# Patient Record
Sex: FEMALE | Race: ASIAN | URBAN - METROPOLITAN AREA
[De-identification: names, ages, dates, MRNs, and addresses within clinical notes are randomized per-mention and may not be internally consistent; named-entity substitution may affect disease eponyms.]

---

## 2018-01-16 ENCOUNTER — RADIANT APPOINTMENT (OUTPATIENT)
Dept: GENERAL RADIOLOGY | Facility: CLINIC | Age: 43
End: 2018-01-16
Attending: FAMILY MEDICINE
Payer: COMMERCIAL

## 2018-01-16 ENCOUNTER — OFFICE VISIT (OUTPATIENT)
Dept: ORTHOPEDICS | Facility: CLINIC | Age: 43
End: 2018-01-16
Payer: COMMERCIAL

## 2018-01-16 VITALS
DIASTOLIC BLOOD PRESSURE: 72 MMHG | WEIGHT: 105 LBS | SYSTOLIC BLOOD PRESSURE: 110 MMHG | BODY MASS INDEX: 18.61 KG/M2 | HEIGHT: 63 IN

## 2018-01-16 DIAGNOSIS — M25.521 RIGHT ELBOW PAIN: Primary | ICD-10-CM

## 2018-01-16 DIAGNOSIS — M77.11 LATERAL EPICONDYLITIS OF RIGHT ELBOW: ICD-10-CM

## 2018-01-16 DIAGNOSIS — M25.521 RIGHT ELBOW PAIN: ICD-10-CM

## 2018-01-16 PROCEDURE — 99203 OFFICE O/P NEW LOW 30 MIN: CPT | Performed by: FAMILY MEDICINE

## 2018-01-16 PROCEDURE — 73080 X-RAY EXAM OF ELBOW: CPT | Mod: RT

## 2018-01-16 NOTE — PATIENT INSTRUCTIONS
1. Right elbow pain    2. Lateral epicondylitis of right elbow      Hand therapy: [facility] - [phone]  Given a cock up wrist brace  Reviewed xray - no arthritis or fluid in the joint  Activity modification: tight , wrist extension or index & middle finger extension will exacerbate your symptoms    Follow up after 3-4 hand therapy sessions if not improving or sooner if needed. Call direct clinic number [[phone]] at any time with questions or concerns.

## 2018-01-16 NOTE — PROGRESS NOTES
"ASSESSMENT & PLAN    1. Right elbow pain    2. Lateral epicondylitis of right elbow      Hand therapy: [facility] - [phone]  Given a cock up wrist brace  Reviewed xray - no arthritis or fluid in the joint  Activity modification: tight , wrist extension or index & middle finger extension will exacerbate your symptoms    Follow up after 3-4 hand therapy sessions if not improving or sooner if needed. Call direct clinic number [[phone]] at any time with questions or concerns.    -----    SUBJECTIVE  Eri Luna is a/an 42 year old right hand dominant female who is seen as a self referral for evaluation of right elbow pain. The patient is seen by themselves.    Onset: 3 month(s) ago. Reports insidious onset without acute precipitating event.  Location of Pain: right anterior elbow pain   Rating of Pain at worst: 10/10 (extending elbow after sleeping)  Rating of Pain Currently: 0/10  Worsened by: elbow flexion and lifting, extending arm in the morning  Better with: massage / TENS - but short duration  Treatments tried: rest/activity avoidance, heat, home exercises, physical therapy (stretch, theraband/barbell strengthening) and chiropractic care   Associated symptoms: tingling in all of her fingers (all 5 - just the tips, very intermittent)  Orthopedic history: NO  Relevant surgical history: NO  Patient Social History: works at [employer]    Patient's past medical, surgical, social, and family histories were reviewed today and no changes are noted.    REVIEW OF SYSTEMS:  10 point ROS is negative other than symptoms noted above in HPI, Past Medical History or as stated below  Constitutional: NEGATIVE for fever, chills, change in weight  Skin: NEGATIVE for worrisome rashes, moles or lesions  GI/: NEGATIVE for bowel or bladder changes  Neuro: NEGATIVE for weakness, dizziness or paresthesias    OBJECTIVE:  /72  Ht 5' 3\" (1.6 m)  Wt 105 lb (47.6 kg)  BMI 18.6 " kg/m2   General: healthy, alert and in no distress  HEENT: no scleral icterus or conjunctival erythema  Skin: no suspicious lesions or rash. No jaundice.  CV: regular rhythm by palpation  Resp: normal respiratory effort without conversational dyspnea   Psych: normal mood and affect  Gait: normal steady gait with appropriate coordination and balance  Neuro: Normal sensory exam of bilateral hands.   MSK:  RIGHT ELBOW  Inspection:    No swelling or obvious deformity or asymmetry  Palpation:    Tender about the lateral epicondyle, common extensor tendon and mildly tender in distal bicep muscle. Nontender over distal bicep tendon/radial tuberosity. Remainder of bony, ligamentous and tendinous landmarks are nontender.    Crepitus is Absent  Range of Motion:     Extension full / flexion full / pronation full / supination full  Strength:    No deficits in flexion, extension, pronation, or supination.  Special Tests:    Positive: Pain with resisted wrist extension, pain with resisted middle finger extension and tight     Negative: Speed's and Finkelsteins.    Independent visualization of the below image:  Recent Results (from the past 24 hour(s))   XR Elbow Right G/E 3 Views    Narrative    XR ELBOW RT G/E 3 VW 1/16/2018 12:03 PM     HISTORY: ; Right elbow pain      Impression    IMPRESSION: Negative exam.    JESENIA CARBAJAL MD     Patient's conditions were thoroughly discussed during today's visit with greater than 50% of the visit spent counseling the patient with total time spent face-to-face with the patient being 20 minutes.    Quincy Galindo, DO [facility]

## 2018-01-16 NOTE — MR AVS SNAPSHOT
After Visit Summary   1/16/2018    Eri Luna    MRN: [id number]           Patient Information     Date Of Birth          1975        Visit Information        Provider Department      1/16/2018 11:40 AM Quincy Galindo DO [facility]        Today's Diagnoses     Right elbow pain    -  1    Lateral epicondylitis of right elbow          Care Instructions    1. Right elbow pain    2. Lateral epicondylitis of right elbow      Hand therapy: [facility] - [phone]  Given a cock up wrist brace  Reviewed xray - no arthritis or fluid in the joint  Activity modification: tight , wrist extension or index & middle finger extension will exacerbate your symptoms    Follow up after 3-4 hand therapy sessions if not improving or sooner if needed. Call direct clinic number [[phone]] at any time with questions or concerns.              Follow-ups after your visit        Additional Services     [facility] PT, HAND, AND CHIROPRACTIC REFERRAL       **This order will print in the [facility] Scheduling Office**    Physical Therapy, Hand Therapy and Chiropractic Care are available through:    *[facility]  *[facility] [place]  *[facility]    Call one number to schedule at any of the above locations: [phone].    Your provider has referred you to: Hand Therapy    Indication/Reason for Referral: Elbow Pain - Right Lat Epi  Onset of Illness: see chart  Therapy Orders: Evaluate and Treat  Special Programs: None  Special Request: please provide custom wrist orthosis as appropriate    Paolo Boone      Additional Comments for the Therapist or Chiropractor:     Please be aware that coverage of these services is subject to the terms and limitations of your health insurance plan.  Call member services at your health plan with any benefit or coverage questions.      Please bring the following to your appointment:    *Your personal  "calendar for scheduling future appointments  *Comfortable clothing                  Who to contact     If you have questions or need follow up information about today's clinic visit or your schedule please contact [facility] directly at [phone].  Normal or non-critical lab and imaging results will be communicated to you by MyChart, letter or phone within 4 business days after the clinic has received the results. If you do not hear from us within 7 days, please contact the clinic through MyChart or phone. If you have a critical or abnormal lab result, we will notify you by phone as soon as possible.  Submit refill requests through [employer] or call your pharmacy and they will forward the refill request to us. Please allow 3 business days for your refill to be completed.          Additional Information About Your Visit        [employer] Information     [employer] lets you send messages to your doctor, view your test results, renew your prescriptions, schedule appointments and more. To sign up, go to [email or web address] . Click on \"Log in\" on the left side of the screen, which will take you to the Welcome page. Then click on \"Sign up Now\" on the right side of the page.     You will be asked to enter the access code listed below, as well as some personal information. Please follow the directions to create your username and password.     Your access code is: [email or web address]  Expires: 2018 12:22 PM     Your access code will  in 90 days. If you need help or a new code, please call your [place] clinic or [phone].        Care EveryWhere ID     This is your Care EveryWhere ID. This could be used by other organizations to access your [place] medical records  [id number]        Your Vitals Were     Height BMI (Body Mass Index)                5' 3\" (1.6 m) 18.6 kg/m2           Blood Pressure from Last 3 Encounters:   18 110/72   16 98/74   16 110/63    Weight from Last 3 " Encounters:   01/16/18 105 lb (47.6 kg)   12/22/16 141 lb (64 kg)              We Performed the Following     [facility] PT, HAND, AND CHIROPRACTIC REFERRAL        Primary Care Provider Office Phone # Fax #    Sarah Nagy [phone]       [facility] [address]  [place] [address]        Equal Access to Services     [facility]: Hadii aad ku hadasho Soomaali, waaxda luqadaha, qaybta kaalmada adeegyada, waxay idiin hayaan adeeg kharash la'aan . So [facility] [phone].    ATENCIÓN: Si hattie aguilar, tiene a dave disposición servicios gratuitos de asistencia lingüística. Llame al [phone].    We comply with applicable federal civil rights laws and [place] laws. We do not discriminate on the basis of race, color, national origin, age, disability, sex, sexual orientation, or gender identity.            Thank you!     Thank you for choosing [facility]  for your care. Our goal is always to provide you with excellent care. Hearing back from our patients is one way we can continue to improve our services. Please take a few minutes to complete the written survey that you may receive in the mail after your visit with us. Thank you!             Your Updated Medication List - Protect others around you: Learn how to safely use, store and throw away your medicines at www.disposemymeds.org.          This list is accurate as of: 1/16/18 12:22 PM.  Always use your most recent med list.                   Brand Name Dispense Instructions for use Diagnosis    ibuprofen 400 MG tablet    ADVIL/MOTRIN    60 tablet    Take 1-2 tablets (400-800 mg) by mouth every 6 hours as needed for other (cramping)    Vaginal delivery

## 2018-01-25 ENCOUNTER — THERAPY VISIT (OUTPATIENT)
Dept: OCCUPATIONAL THERAPY | Facility: CLINIC | Age: 43
End: 2018-01-25
Payer: COMMERCIAL

## 2018-01-25 DIAGNOSIS — M25.521 ELBOW PAIN, RIGHT: Primary | ICD-10-CM

## 2018-01-25 PROCEDURE — 97535 SELF CARE MNGMENT TRAINING: CPT | Mod: GO | Performed by: OCCUPATIONAL THERAPIST

## 2018-01-25 PROCEDURE — 97165 OT EVAL LOW COMPLEX 30 MIN: CPT | Mod: GO | Performed by: OCCUPATIONAL THERAPIST

## 2018-01-25 PROCEDURE — 97110 THERAPEUTIC EXERCISES: CPT | Mod: GO | Performed by: OCCUPATIONAL THERAPIST

## 2018-01-25 NOTE — PROGRESS NOTES
Hand Therapy Initial Evaluation  Current Date:  1/25/2018    Subjective:  Eri Luna is a 42 year old R hand dominant female.    Diagnosis: R LEP  MD order date 1/15/18  DOI: Began 3 months ago    Patient reports symptoms of pain, stiffness/loss of motion and weakness/loss of strength of the R elbow which occurred due to Carrying baby, prolonged desk work. Since onset symptoms are gradually getting better. Special tests:  X ray neg for fracture.  Previous treatment: Massage, Chiropractor, Ultrasound, neck and back adjustment, OTC brace. General health as reported by patient is excellent.  Pertinent medical history includes: none.  Medical allergies: none.  Surgical history: none.  Medication history: none.    Occupational Profile Information:  Current occupation is Real Estate  Currently working in normal job without restrictions  Job Tasks: prolonged sitting, computer work  Prior functional level:  no limitations  Barriers include:none  Mobility: No difficulty  Transportation: drives  Leisure activities/hobbies: [email or web address]    Upper Extremity Functional Index Score:  SCORE:   Column Totals: /80: 55   (A lower score indicates greater disability.)      Objective:  Pain Level Report: On scale 0-10/10  Date 1/25/2018    side R    Overall 3    At Rest 0    With Activity 5      Primary Report: location and description  Date 1/25/2018    Side R    Location LEP, forearm, upper arm    Radiation Present    Pain Quality Sharp    Frequency Intermittant    Duration Worse at night    Exacerbated by Lifting overhead, opening a jar    Relieved by Brace, rest    Progression since onset Gradually better       Tenderness: Pain level report on scale 0-10/10  Date 1/25/2018    Side R    Lateral Epicondyle 7    PIN 7    ECRB Insertion 0      Palpation Radial Nerve Path: Pain level report on scale 0-10/10  Date 1/25/2018    Side R    Supraspinatus 0    Triangular Interval 0    Spiral Groove 8    Radial Head 7    DSRN 5-6      Radial Nerve  Tension Test: 25%    Range of Motion Elbow AROM (PROM): WNL per obeservation    Range of Motion Wrist AROM (PROM): WNL per observation    AROM Multi-joint:  Date 1/25/2018 1/25/2018   Side L R   Elbow - FA - Wrist     90 - Neut - Flex NT NT   90 -  Pro  - Flex NT NT   Ext - Neut - Flex 60 65   Ext -  Pro  - Flex 55 45     Resisted Testing: MMT on scale 0-5/5, Pain level report on scale 0-10/10  Date 1/25/2018    Side R    Elbow Ext 5/5, 0/10    Elbow Flex 5/5, 0/10    Sup 5/5, 0/10    Pro 5/5, 0/10    Wrist Ext 4/5, 3-4/10    Wrist Flex 5/5, 0/10    Wrist Ext c elbow extended 3+/10, 5/10    Middle Finger test 4/5, 5/10      Strength: (Measured in pounds, pain scale 0-10/10)   with Elbow at 90: measured in pounds  Date 1/25/2018        Trials Left Right Left Right Left Right Left Right Left Right Left Right   1 45 20             2               3               Avg               Pain  2                with Elbow Extended: measured in pounds  Date 1/25/2018        Trials Left Right Left Right Left Right Left Right Left Right Left Right   1 35 10             2               3               Avg               Pain  3               3 Point Pinch  Date 1/25/2018        Trials Left Right Left Right Left Right Left Right Left Right Left Right   1 7 7             2               3               Avg               Pain 0 0               Lateral Pinch  Date 1/25/2018        Trials Left Right Left Right Left Right Left Right Left Right Left Right   1 7 7             2               3               Avg               Pain 0 0               Assessment:  Patient presents with symptoms consistent with diagnosis of R Lateral Epicondylitis,  with conservative intervention.     Patient's limitations or Problem List includes:  Pain, Decreased ROM/motion, Weakness, Decreased  and Tightness in musculature of the right elbow which interferes with the patient's ability to perform Self Care Tasks (dressing, eating), Work Tasks,  Sleep Patterns, Recreational Activities, Household Chores and Driving  as compared to previous level of function.    Rehab Potential:  Excellent - Return to full activity, no limitations    Patient will benefit from skilled Occupational Therapy to increase ROM, flexibility,  strength and forearm strength and decrease pain to return to previous activity level and resume normal daily tasks and to reach their rehab potential.    Barriers to Learning:  No barrier    Communication Issues:  Patient appears to be able to clearly communicate and understand verbal and written communication and follow directions correctly.    Assessment of Occupational Performance:  5 or more Performance Deficits  Identified Performance Deficits: dressing, child rearing, communication management, health management and maintenance, home establishment and management, meal preparation and cleanup, shopping, sleep and leisure activities      Clinical Decision Making (Complexity): Low complexity    Treatment Explanation:  The following has been discussed with the patient:    RX ordered/plan of care  Anticipated outcomes  Possible risks and side effects    P: Frequency:  1 X week, once daily  Duration:  for 8 weeks    Treatment Plan:    Modalities:  US   Therapeutic Exercise: AROM of elbow and wrist, PROM with stretch to wrist extensors and flexors,  ECRL strengthening progressing to ECRB strengthening (eccentric progressing to concentric ).   Manual Techniques: Radial head mobilization, interosseous membrane glide, friction massage, Myofascial release of the forearm extensors and flexors  Neuro Re-ed: Active/Passive radial nerve glides  Orthosis:  Wrist cock up orthosis     Home Program:   Exercise: AROM of elbow and wrist, PROM with stretch to wrist extensors and flexors, Active radial nerve glides  MFR to the extensor wad  Orthosis: Static orthosis Wrist cock up orthosis during the day.  Elbow strap as needed to re-distribute the pull of the  extensor wad.  Activity: Avoid activities that exacerbate pain in the elbow.  Lift with forearms in neutral position.     Discharge Plan:    Achieve all LTG.  Independent in home treatment program.  Reach maximal therapeutic benefit.    Next Visit:  Check on OTC orthosis  MFR  Nerve gliding  Progress exercise as appropriate

## 2018-01-25 NOTE — MR AVS SNAPSHOT
"              After Visit Summary   1/25/2018    Eri Lnua    MRN: [id number]           Patient Information     Date Of Birth          1975        Visit Information        Provider Department      1/25/2018 4:00 PM Lily Dale, OT [facility]        Today's Diagnoses     Elbow pain, right    -  1       Follow-ups after your visit        Your next 10 appointments already scheduled     Jan 29, 2018 12:30 PM CST   [facility] Hand with Ev Jacome   [facility] ([facility]  )    [address] [facility]  [address] [place] [address]   [phone]            Feb 05, 2018 11:30 AM CST   [facility] with Ev Leclaudionder   [facility] ([facility]  )    [address] [address]   [phone]            Feb 12, 2018 11:00 AM CST   [facility] with Ev Hernandezer   [facility] ([facility]  )    [address] [address] [address] [address]   [phone]              Who to contact     If you have questions or need follow up information about today's clinic visit or your schedule please contact [facility] directly at [phone].  Normal or non-critical lab and imaging results will be communicated to you by [employer], letter or phone within 4 business days after the clinic has received the results. If you do not hear from us within 7 days, please contact the clinic through [employer] or phone. If you have a critical or abnormal lab result, we will notify you by phone as soon as possible.  Submit refill requests through [employer] or call your pharmacy and they will forward the refill request to us. Please allow 3 business days for your refill to be completed.          Additional Information About Your Visit        [employer] Information     [employer] lets you send messages to your doctor, view your test results, renew your prescriptions, schedule appointments and more. To sign up, go to [email or web address] . Click on \"Log in\" on the " "left side of the screen, which will take you to the Welcome page. Then click on \"Sign up Now\" on the right side of the page.     You will be asked to enter the access code listed below, as well as some personal information. Please follow the directions to create your username and password.     Your access code is: [email or web address]  Expires: 2018 12:22 PM     Your access code will  in 90 days. If you need help or a new code, please call your [place] clinic or [phone].        Care EveryWhere ID     This is your Care EveryWhere ID. This could be used by other organizations to access your [place] medical records  [id number]         Blood Pressure from Last 3 Encounters:   18 110/72   16 98/74   16 110/63    Weight from Last 3 Encounters:   18 47.6 kg (105 lb)   16 64 kg (141 lb)              We Performed the Following     [facility] OT EVAL, LOW COMPLEXITY     [facility] INITIAL EVAL REPORT     SELF CARE MNGMENT TRAINING     THERAPEUTIC EXERCISES        Primary Care Provider Office Phone # Fax #    Sarah URSULA Nagy [phone]       [facility] [address] [facility] [address]  [facility] [address]        Equal Access to Services     [facility]: Hadii aad ku hadasho Soomaali, waaxda luqadaha, qaybta kaalmada adeegyada, waxay idiin hayaan ziyad aguilar . So [facility] [phone].    ATENCIÓN: Si habla español, tiene a dave disposición servicios gratuitos de asistencia lingüística. Llame al [phone].    We comply with applicable federal civil rights laws and [place] laws. We do not discriminate on the basis of race, color, national origin, age, disability, sex, sexual orientation, or gender identity.            Thank you!     Thank you for choosing [facility]  for your care. Our goal is always to provide you with excellent care. Hearing back from our patients is one way we can continue to improve our services. Please take a few minutes to complete the written " survey that you may receive in the mail after your visit with us. Thank you!             Your Updated Medication List - Protect others around you: Learn how to safely use, store and throw away your medicines at www.disposemymeds.org.          This list is accurate as of 1/25/18  5:23 PM.  Always use your most recent med list.                   Brand Name Dispense Instructions for use Diagnosis    ibuprofen 400 MG tablet    ADVIL/MOTRIN    60 tablet    Take 1-2 tablets (400-800 mg) by mouth every 6 hours as needed for other (cramping)    Vaginal delivery       order for DME     1 Device    Equipment being ordered: right cock up wrist brace- universal    Right elbow pain, Lateral epicondylitis of right elbow

## 2018-01-29 ENCOUNTER — THERAPY VISIT (OUTPATIENT)
Dept: OCCUPATIONAL THERAPY | Facility: CLINIC | Age: 43
End: 2018-01-29
Payer: COMMERCIAL

## 2018-01-29 DIAGNOSIS — M25.521 ELBOW PAIN, RIGHT: ICD-10-CM

## 2018-01-29 PROCEDURE — 97110 THERAPEUTIC EXERCISES: CPT | Mod: GO | Performed by: OCCUPATIONAL THERAPIST

## 2018-01-29 PROCEDURE — 97112 NEUROMUSCULAR REEDUCATION: CPT | Mod: GO | Performed by: OCCUPATIONAL THERAPIST

## 2018-01-29 PROCEDURE — 97140 MANUAL THERAPY 1/> REGIONS: CPT | Mod: GO | Performed by: OCCUPATIONAL THERAPIST

## 2018-03-06 NOTE — PROGRESS NOTES
Pt has not returned for therapy since 1/29/18.  Assume all goals are met to pt satisfaction.  D/C [facility].